# Patient Record
Sex: FEMALE | Race: WHITE | URBAN - METROPOLITAN AREA
[De-identification: names, ages, dates, MRNs, and addresses within clinical notes are randomized per-mention and may not be internally consistent; named-entity substitution may affect disease eponyms.]

---

## 2022-03-02 ENCOUNTER — APPOINTMENT (OUTPATIENT)
Dept: HEMATOLOGY/ONCOLOGY | Facility: HOSPITAL | Age: 27
End: 2022-03-02
Attending: GENETIC COUNSELOR, MS
Payer: COMMERCIAL

## 2022-03-02 ENCOUNTER — APPOINTMENT (OUTPATIENT)
Dept: GENETICS | Facility: HOSPITAL | Age: 27
End: 2022-03-02
Attending: GENETIC COUNSELOR, MS
Payer: COMMERCIAL

## 2022-03-03 ENCOUNTER — NURSE ONLY (OUTPATIENT)
Dept: HEMATOLOGY/ONCOLOGY | Facility: HOSPITAL | Age: 27
End: 2022-03-03
Attending: GENETIC COUNSELOR, MS
Payer: COMMERCIAL

## 2022-03-03 ENCOUNTER — GENETICS ENCOUNTER (OUTPATIENT)
Dept: GENETICS | Facility: HOSPITAL | Age: 27
End: 2022-03-03
Attending: GENETIC COUNSELOR, MS
Payer: COMMERCIAL

## 2022-03-03 DIAGNOSIS — Z80.0 FAMILY HISTORY OF PANCREATIC CANCER: ICD-10-CM

## 2022-03-03 DIAGNOSIS — Z80.3 FAMILY HISTORY OF BREAST CANCER: Primary | ICD-10-CM

## 2022-03-03 DIAGNOSIS — Z80.9 FAMILY HISTORY OF CANCER: ICD-10-CM

## 2022-03-03 DIAGNOSIS — Z31.430 ENCOUNTER OF FEMALE FOR TESTING FOR GENETIC DISEASE CARRIER STATUS FOR PROCREATIVE MANAGEMENT: ICD-10-CM

## 2022-03-03 PROCEDURE — 96040 HC GENETIC COUNSELING EA 30 MIN: CPT

## 2022-03-03 PROCEDURE — 36415 COLL VENOUS BLD VENIPUNCTURE: CPT

## 2022-03-16 ENCOUNTER — TELEPHONE (OUTPATIENT)
Dept: HEMATOLOGY/ONCOLOGY | Facility: HOSPITAL | Age: 27
End: 2022-03-16

## 2022-03-16 ENCOUNTER — GENETICS ENCOUNTER (OUTPATIENT)
Dept: HEMATOLOGY/ONCOLOGY | Facility: HOSPITAL | Age: 27
End: 2022-03-16

## 2022-03-16 PROBLEM — Z13.71 BRCA GENE MUTATION NEGATIVE IN FEMALE: Status: ACTIVE | Noted: 2022-03-12

## 2022-03-16 NOTE — PROGRESS NOTES
Patient Name: Jaqueline Mackey  YOB: 1995    Referring Provider:  Emily Valero MD     Reason for Referral:  Ms. Marvine Harada had genetic testing performed on 3/3/2022 because of a family history of breast and pancreatic cancer. Genetic Testing Result:  Negative. No pathogenic variant was found in the following 41 genes on the [facility] breast cancer panel and pancreatic cancer panel: ABRAXAS1, AKT1, APC*, CORRIE*, BARD1, BMPR1A, BRCA1, BRCA2, BRIP1, CDH1, CDK4, CDKN2A (p14ARF), CDKN2A (q11IDG9e), CHEK2, EPCAM*, FANCC, FANCM, MEN1*, MLH1*, MRE11, MSH2*, MSH6*, MUTYH, NF1*, PALB2, PALLD, PIK3CA, PMS2*, PTEN*, RAD51C, RAD51D, RECQL*, RINT1, SDHB, SDHD, SMAD4, STK11, TP53, TSC1*, TSC2, VHL, XRCC2. Please refer to the report from [facility] for additional testing information. These results were discussed with Ms. Marvine Harada via telephone on 3/16/2022. Summary and Plan:   These results indicate it is unlikely that Ms. Marvine Harada has a pathogenic variant (harmful genetic mutation) in any of the genes listed above. No pathogenic variants associated with hereditary breast, pancreatic, or related cancer syndromes were identified. The etiology Ms. Burgess's family history of cancer remains unexplained. Management and surveillance for Ms. Burgess and other family members should be based on their personal and family history, with screening for cancers beginning 10 years younger than the earliest age at diagnosis if it would push screening to start at an earlier age than recommended for the general population. All medical management decisions should be made with a physician. The limitations of the testing were discussed with Ms. Burgess including the chance that a pathogenic variant in a gene other than those included in this analysis might be the cause of cancer in Ms. Burgess or in relatives.      Presently, there are multiple models available to calculate risks for an individual to develop breast cancer over their lifetime. Each model takes into consideration different factors. Based on the personal health and pedigree information provided at this point in time, Ms. Burgess would be assessed a lifetime risk of 20.0% (Tyrer-Cuzick DAMIEN V.8.b) to develop breast cancer (compared to a 11.2% lifetime risk for the average woman). [employer] and the [address] recommends high-risk individuals with a lifetime breast cancer risk >20% receive annual screening breast MRI in addition to annual screening mammography, regardless of breast density. If the patient is unable to receive MRI, then supplemental screening with ultrasound or MBI in addition to routine mammography may be appropriate. It is important to note that the estimation of breast cancer risk changes over time. It may increase or  over time based on age and changes in the personal and/or family history. I encouraged Ms. Burgess to share the genetic test results with her relatives so that they may discuss the implications of this information with their health providers. Ms. Heather Victor is also encouraged to contact me on an annual basis to learn if there have been any updates in genetic testing that would apply or if there are changes in the personal and/or family history. Please do not hesitate to contact my office if you have any questions or concerns, [phone].      Piper Castelan MS, CGC

## 2022-04-07 ENCOUNTER — GENETICS ENCOUNTER (OUTPATIENT)
Dept: HEMATOLOGY/ONCOLOGY | Facility: HOSPITAL | Age: 27
End: 2022-04-07

## 2022-04-07 ENCOUNTER — TELEPHONE (OUTPATIENT)
Dept: HEMATOLOGY/ONCOLOGY | Facility: HOSPITAL | Age: 27
End: 2022-04-07

## 2022-04-07 NOTE — PROGRESS NOTES
Patient Name: Sunita Zepeda  YOB: 1995    Referring Provider:  Leola Weiss MD     Reason for Referral:  Ms. Trudy Good had reproductive carrier screening performed on 3/3/2022. Genetic Testing Result:  Positive. Ms. 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 She was not found to be a carrier of any other genetic conditions tested by the 289 genes included on the [facility] comprehensive carrier screen. Please refer to the report from [facility] for additional testing information and full gene list. These results were discussed with Ms. Trudy Good via telephone on 2022. Summary and Plan:   These results indicate that Ms. 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 Simmons reproductive partner for at least spinal muscular atrophy is recommended. Ms. 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 The presence of a benign variant or a pseudodeficiency allele does not impact Ms. Mari's risk to be a carrier. Individuals with benign variants or pseudodeficiency alleles may exhibit false positive results on related biochemical tests, including  screening; however, benign variants and pseudodeficiency alleles are not known to cause disease. The limitations of the testing were discussed with Ms. Burgess including the chance, even with a negative result, that she could still be a carrier for one of the three disorders tested, this is called the residual risk.  Please see the genetic testing report from [facility] for all residual risk figures. Negative genetic testing results do not guarantee the birth of a healthy child. I encouraged Ms. Burgess to share the genetic test results with her reproductive partner or other family members so that they may discuss the implications of this information with their health providers. Please do not hesitate to contact my office if you have any questions or concerns, [phone].        Molly Villafana MS, CGC

## (undated) DIAGNOSIS — Z13.71 BRCA GENE MUTATION NEGATIVE IN FEMALE: Primary | ICD-10-CM

## (undated) DIAGNOSIS — Z14.8 CARRIER OF GENETIC DISORDER: Primary | ICD-10-CM